# Patient Record
Sex: FEMALE
[De-identification: names, ages, dates, MRNs, and addresses within clinical notes are randomized per-mention and may not be internally consistent; named-entity substitution may affect disease eponyms.]

---

## 2024-11-01 ENCOUNTER — P2P PATIENT RECORD (OUTPATIENT)
Age: 54
End: 2024-11-01

## 2024-11-04 ENCOUNTER — DASHBOARD ENCOUNTERS (OUTPATIENT)
Age: 54
End: 2024-11-04

## 2024-11-04 ENCOUNTER — TELEPHONE ENCOUNTER (OUTPATIENT)
Dept: URBAN - METROPOLITAN AREA CLINIC 63 | Facility: CLINIC | Age: 54
End: 2024-11-04

## 2024-11-07 ENCOUNTER — OFFICE VISIT (OUTPATIENT)
Dept: URBAN - METROPOLITAN AREA CLINIC 60 | Facility: CLINIC | Age: 54
End: 2024-11-07
Payer: COMMERCIAL

## 2024-11-07 ENCOUNTER — LAB OUTSIDE AN ENCOUNTER (OUTPATIENT)
Dept: URBAN - METROPOLITAN AREA CLINIC 60 | Facility: CLINIC | Age: 54
End: 2024-11-07

## 2024-11-07 VITALS
SYSTOLIC BLOOD PRESSURE: 130 MMHG | HEART RATE: 79 BPM | RESPIRATION RATE: 12 BRPM | WEIGHT: 207 LBS | OXYGEN SATURATION: 99 % | DIASTOLIC BLOOD PRESSURE: 74 MMHG | TEMPERATURE: 98.7 F | BODY MASS INDEX: 30.66 KG/M2 | HEIGHT: 69 IN

## 2024-11-07 DIAGNOSIS — Z87.19 HISTORY OF DIVERTICULITIS: ICD-10-CM

## 2024-11-07 DIAGNOSIS — Z12.11 SCREENING FOR MALIGNANT NEOPLASM OF COLON: ICD-10-CM

## 2024-11-07 PROCEDURE — 99204 OFFICE O/P NEW MOD 45 MIN: CPT

## 2024-11-07 RX ORDER — METRONIDAZOLE 500 MG/1
TAKE ONE TABLET BY MOUTH EVERY 8 HOURS FOR 10 DAYS TABLET, FILM COATED ORAL
Qty: 30 UNSPECIFIED | Refills: 0 | Status: ACTIVE | COMMUNITY

## 2024-11-07 RX ORDER — MEDROXYPROGESTERONE ACETATE 5 MG/1
TAKE ONE TABLET BY MOUTH ONE TIME DAILY TABLET ORAL
Qty: 90 UNSPECIFIED | Refills: 0 | Status: ACTIVE | COMMUNITY

## 2024-11-07 RX ORDER — ESTRADIOL 0.5 MG/1
TAKE ONE TABLET BY MOUTH ONE TIME DAILY TABLET ORAL
Qty: 90 UNSPECIFIED | Refills: 0 | Status: ACTIVE | COMMUNITY

## 2024-11-07 RX ORDER — CIPROFOLXACIN 500 MG/1
TAKE ONE TABLET BY MOUTH EVERY 12 HOURS FOR 10 DAYS TABLET ORAL
Qty: 12 UNSPECIFIED | Refills: 0 | Status: ACTIVE | COMMUNITY

## 2024-11-07 RX ORDER — CLONAZEPAM 1 MG/1
TAKE ONE TABLET BY MOUTH ONE TIME DAILY TABLET ORAL
Qty: 30 UNSPECIFIED | Refills: 1 | Status: ACTIVE | COMMUNITY

## 2024-11-07 RX ORDER — SERTRALINE HYDROCHLORIDE 25 MG/1
TAKE ONE TABLET BY MOUTH ONE TIME DAILY TABLET, FILM COATED ORAL
Qty: 90 UNSPECIFIED | Refills: 3 | Status: ACTIVE | COMMUNITY

## 2024-11-07 NOTE — HPI-TODAY'S VISIT:
Patient is a 54-year-old female who presents today as a new patient after episode of diverticulitis.  Patient states that she was in [place] when she began experiencing abdominal pain.  She presented to the ED on 8/28/2024 for LLQ pain and fever.  CT abdomen revealed acute diverticulitis in the descending colon without abscess.  Patient was given a prescription for Bactrim and Flagyl before being discharged.  At today's visit patient states that last week Tuesday she began experiencing familiar symptoms of diverticulitis and called her PCP who gave her prescription for Cipro and Flagyl.  Patient states that she has never had a colonoscopy and has no family history of colorectal cancers. She also states that she has been dealing with constipation and takes stool softener daily with good symptom relief.  Patient denies fever, dysphagia, acid reflux, change in appetite, nausea, vomiting, diarrhea, hematemesis, hematochezia, melena, change in stool frequency/caliber, unintentional weight loss/gain. . CT abdomen/pelvis without contrast 8/28/2024 - Scattered diverticula in the colon - Mucosal thickening of the descending colon with surrounding inflammation consistent with acute diverticulitis, no abscess . Labs 8/28/2024 - BMP: Sodium 134, calcium 8.6, glucose 111 otherwise unremarkable - CBCD: RBC 4.02, MCH 32.8 otherwise unremarkable . Labs 5/18/2024 - CMP within normal limits - Free T4 and TSH within normal limits - Vitamin D within normal limits - Lipid panel: HDL 43, triglycerides 206, , non- otherwise unremarkable

## 2025-02-28 ENCOUNTER — OFFICE VISIT (OUTPATIENT)
Dept: URBAN - METROPOLITAN AREA SURGERY CENTER 4 | Facility: SURGERY CENTER | Age: 55
End: 2025-02-28

## 2025-02-28 ENCOUNTER — TELEPHONE ENCOUNTER (OUTPATIENT)
Dept: URBAN - METROPOLITAN AREA CLINIC 63 | Facility: CLINIC | Age: 55
End: 2025-02-28

## 2025-02-28 RX ORDER — METRONIDAZOLE 500 MG/1
TAKE ONE TABLET BY MOUTH EVERY 8 HOURS FOR 10 DAYS TABLET, FILM COATED ORAL
Qty: 30 UNSPECIFIED | Refills: 0 | Status: ACTIVE | COMMUNITY

## 2025-02-28 RX ORDER — ESTRADIOL 0.5 MG/1
TAKE ONE TABLET BY MOUTH ONE TIME DAILY TABLET ORAL
Qty: 90 UNSPECIFIED | Refills: 0 | Status: ACTIVE | COMMUNITY

## 2025-02-28 RX ORDER — CIPROFOLXACIN 500 MG/1
TAKE ONE TABLET BY MOUTH EVERY 12 HOURS FOR 10 DAYS TABLET ORAL
Qty: 12 UNSPECIFIED | Refills: 0 | Status: ACTIVE | COMMUNITY

## 2025-02-28 RX ORDER — CLONAZEPAM 1 MG/1
TAKE ONE TABLET BY MOUTH ONE TIME DAILY TABLET ORAL
Qty: 30 UNSPECIFIED | Refills: 1 | Status: ACTIVE | COMMUNITY

## 2025-02-28 RX ORDER — MEDROXYPROGESTERONE ACETATE 5 MG/1
TAKE ONE TABLET BY MOUTH ONE TIME DAILY TABLET ORAL
Qty: 90 UNSPECIFIED | Refills: 0 | Status: ACTIVE | COMMUNITY

## 2025-02-28 RX ORDER — SERTRALINE HYDROCHLORIDE 25 MG/1
TAKE ONE TABLET BY MOUTH ONE TIME DAILY TABLET, FILM COATED ORAL
Qty: 90 UNSPECIFIED | Refills: 3 | Status: ACTIVE | COMMUNITY

## 2025-03-13 ENCOUNTER — OFFICE VISIT (OUTPATIENT)
Dept: URBAN - METROPOLITAN AREA CLINIC 60 | Facility: CLINIC | Age: 55
End: 2025-03-13